# Patient Record
Sex: MALE | RURAL
[De-identification: names, ages, dates, MRNs, and addresses within clinical notes are randomized per-mention and may not be internally consistent; named-entity substitution may affect disease eponyms.]

---
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
## 2024-05-03 ENCOUNTER — RX ONLY (RX ONLY)
Age: 19
End: 2024-05-03

## 2024-05-03 RX ORDER — BENZOYL PEROXIDE 5 G/100G
LIQUID TOPICAL BID
Qty: 142 | Refills: 6 | Status: ERX | COMMUNITY
Start: 2024-05-02

## 2024-05-03 RX ORDER — CLINDAMYCIN PHOSPHATE 10 MG/ML
LOTION TOPICAL BID
Qty: 60 | Refills: 6 | Status: ERX